# Patient Record
Sex: MALE | Race: ASIAN | NOT HISPANIC OR LATINO | Employment: FULL TIME | URBAN - METROPOLITAN AREA
[De-identification: names, ages, dates, MRNs, and addresses within clinical notes are randomized per-mention and may not be internally consistent; named-entity substitution may affect disease eponyms.]

---
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
## 2021-06-01 VITALS — WEIGHT: 124.4 LBS | BODY MASS INDEX: 17.42 KG/M2 | HEIGHT: 71 IN

## 2021-06-20 NOTE — PROGRESS NOTES
[facility] Well Child Check    ASSESSMENT & PLAN  Gustavo Chaudhari is a 18  y.o. 7  m.o. who has normal growth and normal development.    Diagnoses and all orders for this visit:    Need for meningitis vaccination      Return to clinic in 1 year for a Well Child Check or sooner as needed    IMMUNIZATIONS/LABS  Immunizations were reviewed and orders were placed as appropriate.    REFERRALS  Dental:  Recommend routine dental care as appropriate.  Other:  Referrals were made for dentist    ANTICIPATORY GUIDANCE  I have reviewed age appropriate anticipatory guidance.  Social:  Friends, Need for Privacy and Extracurricular Activities  Parenting:  Support, Mason/Dependence, Family Time and Confidential Health Care  Nutrition:  Junk Food and Dieting  Play and Communication:  Organized Sports, Hobbies and Creative Talents  Health:  Drugs, Smoking, Alcohol, Self Testicular Exam, Sleep and Dental Care  Safety:  Seat Belts, Swimming Safety, Contact Sports, Recreational vehicles, Bike/Motorcycle Helmets and Outdoor Safety Avoiding Sun Exposure  Sexuality:  Safe Sex    HEALTH HISTORY  Do you have any concerns that you'd like to discuss today?: No concerns       No question data found.    Do you have any significant health concerns in your family history?: No  Family History   Problem Relation Age of Onset     Asthma Sister      Hypertension Paternal Grandfather      Kidney disease Paternal Grandfather      Glaucoma Paternal Grandmother      Hyperlipidemia Father      Since your last visit, have there been any major changes in your family, such as a move, job change, separation, divorce, or death in the family?: No  Has a lack of transportation kept you from medical appointments?: No    Home  Who lives in your home?:  Mom and Dad  Social History     Social History Narrative     Do you have any concerns about losing your housing?: No  Is your housing safe and comfortable?: Yes  Do you have any trouble with sleep?:   No    Education  What school do you child attend?:  "[employer]  What grade are you in?:  12th  How do you perform in school (grades, behavior, attention, homework?: No Concerns     Eating  Do you eat regular meals including fruits and vegetables?:  yes  What are you drinking (cow's milk, water, soda, juice, sports drinks, energy drinks, etc)?: water  Have you been worried that you don't have enough food?: No  Do you have concerns about your body or appearance?:  No    Activities  Do you have friends?:  yes  Do you get at least one hour of physical activity per day?:  yes  How many hours a day are you in front of a screen other than for schoolwork (computer, TV, phone)?:  3  What do you do for exercise?:  Soccer and Dancing  Do you have interest/participate in community activities/volunteers/school sports?:  yes    MENTAL HEALTH SCREENING  No Data Recorded  No Data Recorded    VISION/HEARING  Vision: Completed. See Results  Hearing:  Completed. See Results     Hearing Screening    125Hz 250Hz 500Hz 1000Hz 2000Hz 3000Hz 4000Hz 6000Hz 8000Hz   Right ear: 25 30 25 20 20 20 20 20 20   Left ear: 25 30 20 20 20 20 20 20 20      Visual Acuity Screening    Right eye Left eye Both eyes   Without correction: 20/20 20/16 20/16   With correction:          TB Risk Assessment:  The patient and/or parent/guardian answer positive to:  self or family member has traveled outside of the [place] in the past 12 months    Dyslipidemia Risk Screening  Have either of your parents or any of your grandparents had a stroke or heart attack before age 55?: No  Any parents with high cholesterol or currently taking medications to treat?: No     Dental  When was the last time you saw the dentist?: over 12 months ago      Patient Active Problem List   Diagnosis     Acute Otitis Media       Drugs  Does the patient use tobacco/alcohol/drugs?:  no    Safety  Does the patient have any safety concerns (peer or home)?:  no  Does  "the patient use safety belts, helmets and other safety equipment?:  yes    Sex  Have you ever had sex?:  No    MEASUREMENTS  Height:  5' 11\" (1.803 m)  Weight: 124 lb 6.4 oz (56.4 kg)  BMI: Body mass index is 17.35 kg/(m^2).  Blood Pressure: (!) 90/60  Blood pressure percentiles are <1 % systolic and 15 % diastolic based on the 2017 AAP Clinical Practice Guideline. Blood pressure percentile targets: 90: 133/82, 95: 138/86, 95 + 12 mmH/98.    PHYSICAL EXAM  Physical Exam   General appearance - alert, well appearing, and in no distress  Mental status - alert, oriented to person, place, and time  Eyes - pupils equal and reactive, extraocular eye movements intact  Ears - bilateral TM's and external ear canals normal  Nose - normal and patent, no erythema, discharge or polyps  Mouth - mucous membranes moist, pharynx normal without lesions  Neck - supple, no significant adenopathy  Lymphatics - no palpable lymphadenopathy, no hepatosplenomegaly  Chest - clear to auscultation, no wheezes, rales or rhonchi, symmetric air entry  Heart - normal rate and regular rhythm, S1 and S2 normal, no murmurs noted  Abdomen - soft, nontender, nondistended, no masses or organomegaly   Male - no penile lesions or discharge, no testicular masses or tenderness, no hernias  Back exam - full range of motion, no tenderness, palpable spasm or pain on motion, normal reflexes and strength bilateral lower extremities, sensory exam intact bilateral lower extremities  Neurological - alert, oriented, normal speech, no focal findings or movement disorder noted, neck supple without rigidity, cranial nerves II through XII intact, DTR's normal and symmetric, motor and sensory grossly normal bilaterally, normal muscle tone, no tremors, strength 5/5  Musculoskeletal - no joint tenderness, deformity or swelling  Extremities - peripheral pulses normal, no pedal edema, no clubbing or cyanosis  Skin - normal coloration and turgor, no rashes, no " suspicious skin lesions noted    Kayode Enrique, CNP

## 2023-10-03 ENCOUNTER — NURSE TRIAGE (OUTPATIENT)
Dept: NURSING | Facility: CLINIC | Age: 23
End: 2023-10-03

## 2023-10-03 ENCOUNTER — OFFICE VISIT (OUTPATIENT)
Dept: FAMILY MEDICINE | Facility: CLINIC | Age: 23
End: 2023-10-03
Payer: COMMERCIAL

## 2023-10-03 ENCOUNTER — ANCILLARY PROCEDURE (OUTPATIENT)
Dept: GENERAL RADIOLOGY | Facility: CLINIC | Age: 23
End: 2023-10-03
Attending: FAMILY MEDICINE
Payer: COMMERCIAL

## 2023-10-03 VITALS
WEIGHT: 145.4 LBS | DIASTOLIC BLOOD PRESSURE: 69 MMHG | RESPIRATION RATE: 14 BRPM | BODY MASS INDEX: 20.81 KG/M2 | HEIGHT: 70 IN | TEMPERATURE: 98.4 F | HEART RATE: 68 BPM | OXYGEN SATURATION: 96 % | SYSTOLIC BLOOD PRESSURE: 104 MMHG

## 2023-10-03 DIAGNOSIS — S99.912A ANKLE INJURY, LEFT, INITIAL ENCOUNTER: Primary | ICD-10-CM

## 2023-10-03 DIAGNOSIS — S99.912A ANKLE INJURY, LEFT, INITIAL ENCOUNTER: ICD-10-CM

## 2023-10-03 DIAGNOSIS — S92.155A CLOSED NONDISPLACED AVULSION FRACTURE OF LEFT TALUS, INITIAL ENCOUNTER: ICD-10-CM

## 2023-10-03 PROCEDURE — 90682 RIV4 VACC RECOMBINANT DNA IM: CPT | Performed by: FAMILY MEDICINE

## 2023-10-03 PROCEDURE — 73610 X-RAY EXAM OF ANKLE: CPT | Mod: TC | Performed by: RADIOLOGY

## 2023-10-03 PROCEDURE — 90471 IMMUNIZATION ADMIN: CPT | Performed by: FAMILY MEDICINE

## 2023-10-03 PROCEDURE — 99203 OFFICE O/P NEW LOW 30 MIN: CPT | Mod: 25 | Performed by: FAMILY MEDICINE

## 2023-10-03 ASSESSMENT — PAIN SCALES - PAIN ENJOYMENT GENERAL ACTIVITY SCALE (PEG)
AVG_PAIN_PASTWEEK: 6
INTERFERED_ENJOYMENT_LIFE: 7
INTERFERED_GENERAL_ACTIVITY: 7
PEG_TOTALSCORE: 6.67

## 2023-10-03 ASSESSMENT — PAIN SCALES - GENERAL: PAINLEVEL: SEVERE PAIN (6)

## 2023-10-03 NOTE — NURSING NOTE
Prior to immunization administration, verified patients identity using patient s name and date of birth. Please see Immunization Activity for additional information.     Screening Questionnaire for Adult Immunization    Are you sick today?   No   Do you have allergies to medications, food, a vaccine component or latex?   No   Have you ever had a serious reaction after receiving a vaccination?   No   Do you have a long-term health problem with heart, lung, kidney, or metabolic disease (e.g., diabetes), asthma, a blood disorder, no spleen, complement component deficiency, a cochlear implant, or a spinal fluid leak?  Are you on long-term aspirin therapy?   No   Do you have cancer, leukemia, HIV/AIDS, or any other immune system problem?   No   Do you have a parent, brother, or sister with an immune system problem?   No   In the past 3 months, have you taken medications that affect  your immune system, such as prednisone, other steroids, or anticancer drugs; drugs for the treatment of rheumatoid arthritis, Crohn s disease, or psoriasis; or have you had radiation treatments?   No   Have you had a seizure, or a brain or other nervous system problem?   No   During the past year, have you received a transfusion of blood or blood    products, or been given immune (gamma) globulin or antiviral drug?   No   For women: Are you pregnant or is there a chance you could become       pregnant during the next month?   No   Have you received any vaccinations in the past 4 weeks?   No     Immunization questionnaire answers were all negative.      Patient instructed to remain in clinic for 15 minutes afterwards, and to report any adverse reactions.     Screening performed by Александр Briones MA on 10/3/2023 at 12:04 PM.

## 2023-10-03 NOTE — PROGRESS NOTES
Assessment & Plan     Ankle injury, left, initial encounter  - XR Ankle Left G/E 3 Views; Future    Closed nondisplaced avulsion fracture of left talus, initial encounter  Plan: xray reviewed by me ? Fracture- official report is pending    stabilize ankle in walking boot.idealy non weight bearing and see orthopedic for consultation  See orthopedic for further management.   - Orthopedic  Referral; Future  - Miscellaneous Order for DME - ONLY FOR DME    Ordering of each unique test  I spent a total of 30 minutes on the day of the visit.   Time spent by me doing chart review, history and exam, documentation and further activities per the note           Jaquelin Terrazas MD  [facility]    Leticia Chen is a 23 year old, presenting for the following health issues:  Musculoskeletal Problem        10/3/2023    11:12 AM   Additional Questions   Roomed by MASTER Felix       History of Present Illness       Reason for visit:  Ankle  Symptom onset:  3-7 days ago  Symptoms include:  Ankle swelling  Symptom intensity:  Moderate  Symptom progression:  Improving  Had these symptoms before:  No    He eats 2-3 servings of fruits and vegetables daily.He consumes 1 sweetened beverage(s) daily.He exercises with enough effort to increase his heart rate 20 to 29 minutes per day.  He exercises with enough effort to increase his heart rate 5 days per week.   He is taking medications regularly.     Stepped off the high crub - and ankle rotated awkwardly - 5 days ago.  The ankle swelled up immediately. Is able to ambulate without localized pain.  Swelling is down to > 50% down.  Did wear ASO at home.  Did not bring it to the office appointment .        Review of Systems   Constitutional, HEENT, cardiovascular, pulmonary, GI, , musculoskeletal, neuro, skin, endocrine and psych systems are negative, except as otherwise noted.      Objective    /69 (BP Location: Left arm, Patient Position: Sitting,  "Cuff Size: Adult Regular)   Pulse 68   Temp 98.4  F (36.9  C) (Temporal)   Resp 14   Ht 1.778 m (5' 10\")   Wt 66 kg (145 lb 6.4 oz)   SpO2 96%   BMI 20.86 kg/m    Body mass index is 20.86 kg/m .  Physical Exam   GENERAL: healthy, alert and no distress  Left ankle lateral malleolus swelling & proximal foot swelling tenderness decreased ROM.    Rest of the MS: no gross musculoskeletal defects noted, no edema    No results found for this or any previous visit (from the past 24 hour(s)).                  "

## 2023-10-03 NOTE — TELEPHONE ENCOUNTER
Caller returning missed call from clinic.     Review of the chart reveals notification of a broken talus bone left foot and requests he get a walking boot and ortho appointment.     Sent to sched to sched appointment for the walking boot.   Reason for Disposition    Nursing judgment    Protocols used: No Protocol [id number]

## 2023-10-03 NOTE — PATIENT INSTRUCTIONS
Use ankle support orthotics  Will inform you about official xray report.  Ibuprofen with meals up to three times daily for next 3-5 days  RICE

## 2023-10-04 ENCOUNTER — TELEPHONE (OUTPATIENT)
Dept: ORTHOPEDICS | Facility: CLINIC | Age: 23
End: 2023-10-04
Payer: COMMERCIAL

## 2023-10-04 NOTE — TELEPHONE ENCOUNTER
Orthopedic/Sports Medicine Fracture Triage    Incoming call/or message from call center member.    Fracture type: Ankle.    The patient is in a   boot/cast? .    Date of injury roughly 9/28/23.    Triaged by: Dr. Cedeno .    Determined to be managed Non operatively.    Needs to be seen within 1 week.    Additional Comments/information: MAG Cuadra, ATC

## 2023-10-04 NOTE — TELEPHONE ENCOUNTER
[facility] Call Center     Phone Message     May a detailed message be left on voicemail: Yes     Reason for Call:   Pt has referral for closed nondisplaced avulsion fracture of left talus to be seen in 3-5 days. Pt currently in a ankle cast. Please advise appointment within appropriate time.

## 2023-10-05 NOTE — TELEPHONE ENCOUNTER
DIAGNOSIS: closed nondisplaced avulsion fracture of L talus/Dr. Terrazas/[facility]/ortho con    APPOINTMENT DATE: 10/10/23   NOTES STATUS DETAILS   OFFICE NOTE from referring provider Internal 10/03/23 - Jaquelin Terrazas MD   XRAYS  & INJECTIONS (IMAGES & REPORTS) Internal XR L ANKLE:  - 10/03/23

## 2023-10-09 DIAGNOSIS — M25.572 LEFT ANKLE PAIN: Primary | ICD-10-CM

## 2023-10-10 ENCOUNTER — PRE VISIT (OUTPATIENT)
Dept: ORTHOPEDICS | Facility: CLINIC | Age: 23
End: 2023-10-10